# Patient Record
Sex: MALE | Race: WHITE | NOT HISPANIC OR LATINO | URBAN - METROPOLITAN AREA
[De-identification: names, ages, dates, MRNs, and addresses within clinical notes are randomized per-mention and may not be internally consistent; named-entity substitution may affect disease eponyms.]

---

## 2021-08-09 ENCOUNTER — EMERGENCY (EMERGENCY)
Facility: HOSPITAL | Age: 41
LOS: 1 days | Discharge: HOME | End: 2021-08-09
Attending: EMERGENCY MEDICINE
Payer: COMMERCIAL

## 2021-08-09 VITALS
HEIGHT: 66 IN | SYSTOLIC BLOOD PRESSURE: 159 MMHG | TEMPERATURE: 98 F | WEIGHT: 160.06 LBS | DIASTOLIC BLOOD PRESSURE: 101 MMHG | RESPIRATION RATE: 16 BRPM | OXYGEN SATURATION: 99 % | HEART RATE: 85 BPM

## 2021-08-09 VITALS
OXYGEN SATURATION: 98 % | DIASTOLIC BLOOD PRESSURE: 91 MMHG | SYSTOLIC BLOOD PRESSURE: 143 MMHG | RESPIRATION RATE: 18 BRPM | HEART RATE: 71 BPM

## 2021-08-09 LAB
ALBUMIN SERPL ELPH-MCNC: 4.5 G/DL — SIGNIFICANT CHANGE UP (ref 3.3–5.2)
ALP SERPL-CCNC: 62 U/L — SIGNIFICANT CHANGE UP (ref 40–120)
ALT FLD-CCNC: 17 U/L — SIGNIFICANT CHANGE UP
ANION GAP SERPL CALC-SCNC: 9 MMOL/L — SIGNIFICANT CHANGE UP (ref 5–17)
APPEARANCE UR: CLEAR — SIGNIFICANT CHANGE UP
AST SERPL-CCNC: 17 U/L — SIGNIFICANT CHANGE UP
BASOPHILS # BLD AUTO: 0.04 K/UL — SIGNIFICANT CHANGE UP (ref 0–0.2)
BASOPHILS NFR BLD AUTO: 0.7 % — SIGNIFICANT CHANGE UP (ref 0–2)
BILIRUB SERPL-MCNC: 0.8 MG/DL — SIGNIFICANT CHANGE UP (ref 0.4–2)
BILIRUB UR-MCNC: NEGATIVE — SIGNIFICANT CHANGE UP
BUN SERPL-MCNC: 12.3 MG/DL — SIGNIFICANT CHANGE UP (ref 8–20)
CALCIUM SERPL-MCNC: 9.4 MG/DL — SIGNIFICANT CHANGE UP (ref 8.6–10.2)
CHLORIDE SERPL-SCNC: 102 MMOL/L — SIGNIFICANT CHANGE UP (ref 98–107)
CO2 SERPL-SCNC: 26 MMOL/L — SIGNIFICANT CHANGE UP (ref 22–29)
COLOR SPEC: YELLOW — SIGNIFICANT CHANGE UP
CREAT SERPL-MCNC: 0.78 MG/DL — SIGNIFICANT CHANGE UP (ref 0.5–1.3)
DIFF PNL FLD: NEGATIVE — SIGNIFICANT CHANGE UP
EOSINOPHIL # BLD AUTO: 0.03 K/UL — SIGNIFICANT CHANGE UP (ref 0–0.5)
EOSINOPHIL NFR BLD AUTO: 0.5 % — SIGNIFICANT CHANGE UP (ref 0–6)
GLUCOSE SERPL-MCNC: 90 MG/DL — SIGNIFICANT CHANGE UP (ref 70–99)
GLUCOSE UR QL: NEGATIVE MG/DL — SIGNIFICANT CHANGE UP
HCT VFR BLD CALC: 42.9 % — SIGNIFICANT CHANGE UP (ref 39–50)
HGB BLD-MCNC: 15 G/DL — SIGNIFICANT CHANGE UP (ref 13–17)
IMM GRANULOCYTES NFR BLD AUTO: 0.3 % — SIGNIFICANT CHANGE UP (ref 0–1.5)
KETONES UR-MCNC: NEGATIVE — SIGNIFICANT CHANGE UP
LEUKOCYTE ESTERASE UR-ACNC: NEGATIVE — SIGNIFICANT CHANGE UP
LYMPHOCYTES # BLD AUTO: 1.41 K/UL — SIGNIFICANT CHANGE UP (ref 1–3.3)
LYMPHOCYTES # BLD AUTO: 24.2 % — SIGNIFICANT CHANGE UP (ref 13–44)
MCHC RBC-ENTMCNC: 29.1 PG — SIGNIFICANT CHANGE UP (ref 27–34)
MCHC RBC-ENTMCNC: 35 GM/DL — SIGNIFICANT CHANGE UP (ref 32–36)
MCV RBC AUTO: 83.3 FL — SIGNIFICANT CHANGE UP (ref 80–100)
MONOCYTES # BLD AUTO: 0.46 K/UL — SIGNIFICANT CHANGE UP (ref 0–0.9)
MONOCYTES NFR BLD AUTO: 7.9 % — SIGNIFICANT CHANGE UP (ref 2–14)
NEUTROPHILS # BLD AUTO: 3.86 K/UL — SIGNIFICANT CHANGE UP (ref 1.8–7.4)
NEUTROPHILS NFR BLD AUTO: 66.4 % — SIGNIFICANT CHANGE UP (ref 43–77)
NITRITE UR-MCNC: NEGATIVE — SIGNIFICANT CHANGE UP
PH UR: 7 — SIGNIFICANT CHANGE UP (ref 5–8)
PLATELET # BLD AUTO: 273 K/UL — SIGNIFICANT CHANGE UP (ref 150–400)
POTASSIUM SERPL-MCNC: 4.3 MMOL/L — SIGNIFICANT CHANGE UP (ref 3.5–5.3)
POTASSIUM SERPL-SCNC: 4.3 MMOL/L — SIGNIFICANT CHANGE UP (ref 3.5–5.3)
PROT SERPL-MCNC: 6.8 G/DL — SIGNIFICANT CHANGE UP (ref 6.6–8.7)
PROT UR-MCNC: NEGATIVE MG/DL — SIGNIFICANT CHANGE UP
RBC # BLD: 5.15 M/UL — SIGNIFICANT CHANGE UP (ref 4.2–5.8)
RBC # FLD: 11.8 % — SIGNIFICANT CHANGE UP (ref 10.3–14.5)
SODIUM SERPL-SCNC: 137 MMOL/L — SIGNIFICANT CHANGE UP (ref 135–145)
SP GR SPEC: 1 — LOW (ref 1.01–1.02)
TROPONIN T SERPL-MCNC: <0.01 NG/ML — SIGNIFICANT CHANGE UP (ref 0–0.06)
UROBILINOGEN FLD QL: NEGATIVE MG/DL — SIGNIFICANT CHANGE UP
WBC # BLD: 5.82 K/UL — SIGNIFICANT CHANGE UP (ref 3.8–10.5)
WBC # FLD AUTO: 5.82 K/UL — SIGNIFICANT CHANGE UP (ref 3.8–10.5)

## 2021-08-09 PROCEDURE — 96360 HYDRATION IV INFUSION INIT: CPT

## 2021-08-09 PROCEDURE — 71046 X-RAY EXAM CHEST 2 VIEWS: CPT

## 2021-08-09 PROCEDURE — 99284 EMERGENCY DEPT VISIT MOD MDM: CPT | Mod: 25

## 2021-08-09 PROCEDURE — 36415 COLL VENOUS BLD VENIPUNCTURE: CPT

## 2021-08-09 PROCEDURE — 85025 COMPLETE CBC W/AUTO DIFF WBC: CPT

## 2021-08-09 PROCEDURE — 84484 ASSAY OF TROPONIN QUANT: CPT

## 2021-08-09 PROCEDURE — 81003 URINALYSIS AUTO W/O SCOPE: CPT

## 2021-08-09 PROCEDURE — 99285 EMERGENCY DEPT VISIT HI MDM: CPT

## 2021-08-09 PROCEDURE — G1004: CPT

## 2021-08-09 PROCEDURE — 70450 CT HEAD/BRAIN W/O DYE: CPT | Mod: 26,MG

## 2021-08-09 PROCEDURE — 70450 CT HEAD/BRAIN W/O DYE: CPT | Mod: MG

## 2021-08-09 PROCEDURE — 71046 X-RAY EXAM CHEST 2 VIEWS: CPT | Mod: 26

## 2021-08-09 PROCEDURE — 93010 ELECTROCARDIOGRAM REPORT: CPT

## 2021-08-09 PROCEDURE — 87086 URINE CULTURE/COLONY COUNT: CPT

## 2021-08-09 PROCEDURE — 80053 COMPREHEN METABOLIC PANEL: CPT

## 2021-08-09 PROCEDURE — 82962 GLUCOSE BLOOD TEST: CPT

## 2021-08-09 PROCEDURE — 93005 ELECTROCARDIOGRAM TRACING: CPT

## 2021-08-09 RX ORDER — CITALOPRAM 10 MG/1
1 TABLET, FILM COATED ORAL
Qty: 0 | Refills: 0 | DISCHARGE

## 2021-08-09 RX ORDER — SODIUM CHLORIDE 9 MG/ML
1000 INJECTION INTRAMUSCULAR; INTRAVENOUS; SUBCUTANEOUS ONCE
Refills: 0 | Status: COMPLETED | OUTPATIENT
Start: 2021-08-09 | End: 2021-08-09

## 2021-08-09 RX ORDER — ATORVASTATIN CALCIUM 80 MG/1
1 TABLET, FILM COATED ORAL
Qty: 30 | Refills: 0
Start: 2021-08-09 | End: 2021-09-07

## 2021-08-09 RX ORDER — ASPIRIN/CALCIUM CARB/MAGNESIUM 324 MG
81 TABLET ORAL DAILY
Refills: 0 | Status: DISCONTINUED | OUTPATIENT
Start: 2021-08-09 | End: 2021-08-13

## 2021-08-09 RX ORDER — ATORVASTATIN CALCIUM 80 MG/1
10 TABLET, FILM COATED ORAL ONCE
Refills: 0 | Status: COMPLETED | OUTPATIENT
Start: 2021-08-09 | End: 2021-08-09

## 2021-08-09 RX ORDER — AMLODIPINE BESYLATE 2.5 MG/1
1 TABLET ORAL
Qty: 30 | Refills: 0
Start: 2021-08-09 | End: 2021-09-07

## 2021-08-09 RX ADMIN — SODIUM CHLORIDE 1000 MILLILITER(S): 9 INJECTION INTRAMUSCULAR; INTRAVENOUS; SUBCUTANEOUS at 10:08

## 2021-08-09 RX ADMIN — ATORVASTATIN CALCIUM 10 MILLIGRAM(S): 80 TABLET, FILM COATED ORAL at 16:37

## 2021-08-09 RX ADMIN — SODIUM CHLORIDE 1000 MILLILITER(S): 9 INJECTION INTRAMUSCULAR; INTRAVENOUS; SUBCUTANEOUS at 11:38

## 2021-08-09 RX ADMIN — Medication 81 MILLIGRAM(S): at 16:37

## 2021-08-09 NOTE — ED ADULT NURSE NOTE - NSIMPLEMENTINTERV_GEN_ALL_ED
Implemented All Fall Risk Interventions:  [place] to call system. Call bell, personal items and telephone within reach. Instruct patient to call for assistance. Room bathroom lighting operational. Non-slip footwear when patient is off stretcher. Physically safe environment: no spills, clutter or unnecessary equipment. Stretcher in lowest position, wheels locked, appropriate side rails in place. Provide visual cue, wrist band, yellow gown, etc. Monitor gait and stability. Monitor for mental status changes and reorient to person, place, and time. Review medications for side effects contributing to fall risk. Reinforce activity limits and safety measures with patient and family.

## 2021-08-09 NOTE — CONSULT NOTE ADULT - ATTENDING COMMENTS
syncope. NOn cardiacs likely. Dizziness. likely vertigo or uncontrolled blood pressure.  repeat Blood pressure check and orthostatic check on discharge.,  initiate antihypertensive if BP still eleavted  outpatietn repeat Echo with bubbl;e study  CT head.  aspiirn statins.

## 2021-08-09 NOTE — ED PROVIDER NOTE - NSFOLLOWUPINSTRUCTIONS_ED_ALL_ED_FT
Syncope    Syncope is when you temporarily lose consciousness, also called fainting or passing out. It is caused by a sudden decrease in blood flow to the brain. Even though most causes of syncope are not dangerous, syncope can possibly be a sign of a serious medical problem. Signs that you may be about to faint include feeling dizzy, lightheaded, nausea, visual changes, or cold/clammy skin. Do not drive, operate heavy machinery, or play sports until your health care provider says it is okay.    Take Norvasc (Amlodipine) 5 mg once a day. Take 81 mg Aspirin once a day. Take 10 mg Atorvastatin (Lipitor) once a day. Followup with your Cardiologist when you return home.     SEEK IMMEDIATE MEDICAL CARE IF YOU HAVE ANY OF THE FOLLOWING SYMPTOMS: severe headache, pain in your chest/abdomen/back, bleeding from your mouth or rectum, palpitations, shortness of breath, pain with breathing, seizure, confusion, or trouble walking.

## 2021-08-09 NOTE — ED PROVIDER NOTE - CARE PROVIDER_API CALL
Jose Alberto Harvey)  Cardiology; Internal Medicine  [address] [facility], [address] [place] [id number]  Phone: [phone]  Fax: [phone]  Follow Up Time: Urgent

## 2021-08-09 NOTE — ED ADULT TRIAGE NOTE - CHIEF COMPLAINT QUOTE
pt c/o syncopal episode while at work today.  pt states he has been under a lot of stress at work, not sleeping well for past few days.  pt states he has been feeling dizzy intermittently since Saturday.  denies hitting head from syncopal episode.  pt awake, alert and oriented x3 at this time.

## 2021-08-09 NOTE — ED PROVIDER NOTE - PHYSICAL EXAMINATION
Head: atraumatic, normacephalic  Face: atraumatic, no crepitus no orbiral/maxillary/mandibular ttp  throat: uvula midline no exudates  eyes: perrla eomi  heart: rrr s1s2  lungs: ctab  abd: soft, nt nd +bs no rebound/guarding no cva ttp  skin: warm  LE: no swelling, no calf ttp  back: no midline cervical/thoracic/lumbar ttp  neuro: aaox 3 cn iii-xii intact normal finger to nose strength 5/5 in upper or lower extr

## 2021-08-09 NOTE — ED PROVIDER NOTE - PATIENT PORTAL LINK FT
You can access the FollowMyHealth Patient Portal offered by [facility] by registering at the following website: [email or web address]. By joining [employer]’s FollowMyHealth portal, you will also be able to view your health information using other applications (apps) compatible with our system.

## 2021-08-09 NOTE — ED ADULT NURSE NOTE - OBJECTIVE STATEMENT
Pt biba s/p a syncopal episode while at a work meeting (  from [place]). pt reports he felt lightheaded while talking to a colleague, and felt "the room closing in "and passed out, denies hitting his head, " Fell like a tree, I was told" for a few seconds, no shaking or seizure like activity reported by witness, took a sleeping pill unisome on Friday night and reports he restarted Celexa a few days ago. denies chest pain or sob prior. + very stressed lately,  Pt reports that he is feeling waves of dizziness.

## 2021-08-09 NOTE — CONSULT NOTE ADULT - SUBJECTIVE AND OBJECTIVE BOX
[place] [facility]                                                               Office:  [address] [address]                                                              Telephone: [phone]. Fax:[phone]                                                                        CARDIOLOGY CONSULTATION NOTE                                                                                             Consult requested by:    Reason for Consultation:   History obtained by: Patient and medical record   obtained: No  Cardiologist:    Chief complaint:    Patient is a 41y old  Male who presents with a chief complaint of       HPI:        REVIEW OF SYMPTOMS:     CONSTITUTIONAL: No fever, no weight loss, no fatigue, no weight gain   ENMT:  No difficulty hearing, tinnitus, vertigo; No sinus or throat pain  NECK: No pain or stiffness  CARDIOVASCULAR: No chest pain, no dyspnea, no syncope, no palpitations, no dizziness, no Orthopnea, no Paroxsymal nocturnal dyspnea  RESPIRATORY: No Dyspnea on exertion, no Shortness of breath, no cough, no wheezing  : No dysuria, no hematuria   GI: No dark color stool, no melena, no diarrhea, no constipation, no abdominal pain   NEURO: No headache, no dizziness, no slurred speech   MUSCULOSKELETAL: No joint pain or swelling; No muscle, back, or extremity pain  PSYCH: No agitation, no anxiety.    ALL OTHER REVIEW OF SYSTEMS ARE NEGATIVE.      PREVIOUS DIAGNOSTIC TESTING  ECHO FINDINGS:      STRESS FINDINGS:      CATHETERIZATION FINDINGS:         ALLERGIES: Allergies    No Known Allergies    Intolerances          PAST MEDICAL HISTORY  Anxiety        PAST SURGICAL HISTORY  No significant past surgical history        FAMILY HISTORY:      SOCIAL HISTORY:  Denies smoking/alcohol/drugs  CIGARETTES:     ALCOHOL:  DRUGS:      CURRENT MEDICATIONS:           HOME MEDICATIONS:      Vital Signs Last 24 Hrs  T(C): 36.9 (09 Aug 2021 08:10), Max: 36.9 (09 Aug 2021 08:10)  T(F): 98.4 (09 Aug 2021 08:10), Max: 98.4 (09 Aug 2021 08:10)  HR: 79 (09 Aug 2021 10:56) (69 - 85)  BP: 151/87 (09 Aug 2021 10:56) (140/85 - 159/101)  BP(mean): --  RR: 18 (09 Aug 2021 10:56) (16 - 18)  SpO2: 99% (09 Aug 2021 10:56) (98% - 99%)      PHYSICAL EXAM:  Constitutional: Comfortable . No acute distress.   HEENT: Atraumatic and normocephalic , neck is supple . no JVD. No carotid bruit. PEERL   CNS: A&Ox3. No focal deficits. EOMI. Cranial nerves II-IX are intact.   Respiratory: CTAB, ulabored   Cardiovascular: S1S2 RRR. No murmur/rubs or gallop.  Gastrointestinal: Soft non-tender and non distended . +Bowel sounds. negative Mesa's sign.  Extremities: No edema.   Psychiatric: Calm . no agitation.  Skin: No skin rash/ulcers visualized to face, hands or feet.    Intake and output:    @ 07:01  -   @ 13:18  --------------------------------------------------------  IN: 1000 mL / OUT: 0 mL / NET: 1000 mL        LABS:                        15.0   5.82  )-----------( 273      ( 09 Aug 2021 10:56 )             42.9         137  |  102  |  12.3  ----------------------------<  90  4.3   |  26.0  |  0.78    Ca    9.4      09 Aug 2021 10:56    TPro  6.8  /  Alb  4.5  /  TBili  0.8  /  DBili  x   /  AST  17  /  ALT  17  /  AlkPhos  62      CARDIAC MARKERS ( 09 Aug 2021 10:56 )  x     / <0.01 ng/mL / x     / x     / x        ;p-BNP=    Urinalysis Basic - ( 09 Aug 2021 12:28 )    Color: Yellow / Appearance: Clear / S.005 / pH: x  Gluc: x / Ketone: Negative  / Bili: Negative / Urobili: Negative mg/dL   Blood: x / Protein: Negative mg/dL / Nitrite: Negative   Leuk Esterase: Negative / RBC: x / WBC x   Sq Epi: x / Non Sq Epi: x / Bacteria: x        INTERPRETATION OF TELEMETRY: Reviewed by me.   ECG: Reviewed by me.     RADIOLOGY & ADDITIONAL STUDIES:    X-ray:  reviewed by me.   CT scan:   MRI:                                                                          [place] [facility]                                                               Office:  [address] [address]                                                              Telephone: [phone]. Fax:[phone]                                                                        CARDIOLOGY CONSULTATION NOTE                                                                                             History obtained by: Patient and medical record   obtained: No  Cardiologist: lives in [place]    Chief complaint:    Patient is a 41y old  Male who presents with a chief complaint of syncope      HPI: 42 y/o M with no significan t PMT presents to hospital after sycnopal episode. Pt states over the past few days, increased dizziness, "feeling foggy", "disoriented", yesterday one episode nausea, vomiting. Today standing talking to collegue started to feel dizzy out of it, then passed out, states only out for few seconds. States high stress at this time, lives in [place], here for work, , big trial upcoming. Decreased PO intake. Family hisotry Bicuspid valve, children with PFO. Denies fever, chills, cough, phlegm production, shortness of breath, dyspnea on exertion, orthopnea, PND, edema, chest pain, pressure, palpitations, irregular, fast heart beat..      REVIEW OF SYMPTOMS:   CONSTITUTIONAL: No fever, no weight loss, no fatigue, no weight gain   ENMT:  No difficulty hearing, tinnitus, vertigo; No sinus or throat pain  NECK: No pain or stiffness  CARDIOVASCULAR: No chest pain, no dyspnea, no syncope, no palpitations, + dizziness, no Orthopnea, no Paroxsymal nocturnal dyspnea  RESPIRATORY: No Dyspnea on exertion, no Shortness of breath, no cough, no wheezing  : No dysuria, no hematuria   GI: No dark color stool, no melena, no diarrhea, no constipation, no abdominal pain   NEURO: No headache, + dizziness, no slurred speech   MUSCULOSKELETAL: No joint pain or swelling; No muscle, back, or extremity pain  PSYCH: No agitation, no anxiety.    ALL OTHER REVIEW OF SYSTEMS ARE NEGATIVE.      ALLERGIES: Allergies  No Known Allergies  Intolerances      PAST MEDICAL HISTORY  Anxiety      PAST SURGICAL HISTORY  No significant past surgical history      FAMILY HISTORY:      SOCIAL HISTORY:  Denies smoking/drugs  social ETOH    CURRENT MEDICATIONS:        HOME MEDICATIONS:      Vital Signs Last 24 Hrs  T(C): 36.9 (09 Aug 2021 08:10), Max: 36.9 (09 Aug 2021 08:10)  T(F): 98.4 (09 Aug 2021 08:10), Max: 98.4 (09 Aug 2021 08:10)  HR: 79 (09 Aug 2021 10:56) (69 - 85)  BP: 151/87 (09 Aug 2021 10:56) (140/85 - 159/101)  RR: 18 (09 Aug 2021 10:56) (16 - 18)  SpO2: 99% (09 Aug 2021 10:56) (98% - 99%)      PHYSICAL EXAM:  Constitutional: Comfortable . No acute distress.   HEENT: Atraumatic and normocephalic , neck is supple . no JVD.   CNS: A&Ox3. No focal deficits. EOMI.   Respiratory: CTAB, unlabored   Cardiovascular: S1S2 RRR. No murmur/rubs or gallop.  Gastrointestinal: Soft non-tender and non distended . +Bowel sounds.   Extremities: No edema.   Psychiatric: Calm . no agitation.  Skin: No skin rash/ulcers visualized to face, hands or feet.    Intake and output:    @ 07:01  -   @ 13:18  --------------------------------------------------------  IN: 1000 mL / OUT: 0 mL / NET: 1000 mL        LABS:                        15.0   5.82  )-----------( 273      ( 09 Aug 2021 10:56 )             42.9     08    137  |  102  |  12.3  ----------------------------<  90  4.3   |  26.0  |  0.78    Ca    9.4      09 Aug 2021 10:56    TPro  6.8  /  Alb  4.5  /  TBili  0.8  /  DBili  x   /  AST  17  /  ALT  17  /  AlkPhos  62  08-09    CARDIAC MARKERS ( 09 Aug 2021 10:56 )  x     / <0.01 ng/mL / x     / x     / x        ;p-BNP=    Urinalysis Basic - ( 09 Aug 2021 12:28 )    Color: Yellow / Appearance: Clear / S.005 / pH: x  Gluc: x / Ketone: Negative  / Bili: Negative / Urobili: Negative mg/dL   Blood: x / Protein: Negative mg/dL / Nitrite: Negative   Leuk Esterase: Negative / RBC: x / WBC x   Sq Epi: x / Non Sq Epi: x / Bacteria: x        INTERPRETATION OF TELEMETRY: Reviewed by me.   ECG: SR< LVH    RADIOLOGY & ADDITIONAL STUDIES:    X-ray:  reviewed by me.   CT scan:   MRI:

## 2021-08-09 NOTE — ED ADULT NURSE REASSESSMENT NOTE - NS ED NURSE REASSESS COMMENT FT1
Pt resting comfortably on stretcher, friend at bedside. oob to bathroom with steady gait. denies any new complaints at this time.

## 2021-08-09 NOTE — ED PROVIDER NOTE - OBJECTIVE STATEMENT
42yo M hx of anxiety pw syncopal episode. notes that was standing with his colleague when felt lightheaded, everything becoming dark, nauseous and then passed out. as per pt colleague said no head trauma and no shaking activity. no hx of seizures no urinary incontinence. notes that has been very stressed lately as in court/going for a trial restrted taking his celexa meds few weeks ago, also notes that took a sleeping pill on Friday night after which felt a little disoriented not as focused dizzy on saturday; on Sunday also felt lightheaded, nausea/vomiting. notes intermittent episodes of lightheadedness since. no slurred speech no weakness to any extremities. Denies f/c//cp/sob/palpitations/ cough/rash/headache//abd.pain/d/c/dysuria/hematuria. no sick contacts no recent travel. notes father has a bicuspic aortic valve - had an echo 10 year ago bc of that was normal at that time. no hx of dvt/pe

## 2021-08-09 NOTE — ED ADULT TRIAGE NOTE - AS TEMP SITE
Health Maintenance Due   Topic Date Due    Zoster Vaccine  11/02/2011    Colonoscopy  03/16/2012    Abdominal Aortic Aneurysm Screening  11/02/2016       
oral

## 2021-08-09 NOTE — CONSULT NOTE ADULT - ASSESSMENT
CT head  asa 81mg, atorvastatin 10mg   from [place], will need cardiology follow up as out patient  recommend holter monitor and TTE with bubble study (children x 2 with PFO)  telemetry SR    HTN  recheck BP prior to discharge if >140 start amlodipine 5 mg PO daily  follwo up with PCP      42 y/o M with no significan t PMT presents to hospital after sycnopal episode. Pt states over the past few days, increased dizziness, "feeling foggy", "disoriented", yesterday one episode nausea, vomiting. Today standing talking to collegue started to feel dizzy out of it, then passed out, states only out for few seconds. States high stress at this time, lives in [place], here for work, , big trial upcoming. Decreased PO intake. Family hisotry Bicuspid valve, children with PFO.     syncope   CT head  asa 81mg, atorvastatin 10mg   from [place], will need cardiology follow up as out patient  recommend holter monitor and TTE with bubble study (children x 2 with PFO)  telemetry SR    HTN  recheck BP prior to discharge if >140 start amlodipine 5 mg PO daily  follwo up with PCP

## 2021-08-09 NOTE — ED PROVIDER NOTE - CLINICAL SUMMARY MEDICAL DECISION MAKING FREE TEXT BOX
syncope most likely vasovagal no cp +presyncopal feeling with nausea; vomiting the day before ; notes under a lot of stress this week --will hydrate labs to eval for infectious/metabolic abnormalities ekg tele monitoring reassess syncope most likely vasovagal no cp +presyncopal feeling with nausea; vomiting the day before ; notes under a lot of stress this week --will hydrate labs to eval for infectious/metabolic abnormalities ekg tele monitoring reassess  mild LVH on ekg will consult [facility]

## 2021-08-09 NOTE — ED ADULT NURSE NOTE - HOW OFTEN DO YOU HAVE SIX OR MORE DRINKS ON ONE OCCASION?
Partial Purse String (Simple) Text: Given the location of the defect and the characteristics of the surrounding skin a simple purse string closure was deemed most appropriate.  Undermining was performed circumferentially around the surgical defect.  A purse string suture was then placed and tightened. Wound tension of the circular defect prevented complete closure of the wound. Never

## 2021-08-09 NOTE — ED ADULT NURSE REASSESSMENT NOTE - NS ED NURSE REASSESS COMMENT FT1
Pt returned from head ct, remains A+O x3, v.s.s. Gross neuro intact. pt and friend at bedside requesting a neurologist to eval ct scan and consult. Reviewed with pt that a radiologist reads the scans and that usually if nothing acute, pt follows up as an outpt.

## 2021-08-10 LAB
CULTURE RESULTS: NO GROWTH — SIGNIFICANT CHANGE UP
SPECIMEN SOURCE: SIGNIFICANT CHANGE UP